# Patient Record
Sex: FEMALE | Race: WHITE | NOT HISPANIC OR LATINO
[De-identification: names, ages, dates, MRNs, and addresses within clinical notes are randomized per-mention and may not be internally consistent; named-entity substitution may affect disease eponyms.]

---

## 2017-01-04 ENCOUNTER — OTHER (OUTPATIENT)
Age: 57
End: 2017-01-04

## 2017-02-21 ENCOUNTER — APPOINTMENT (OUTPATIENT)
Dept: GYNECOLOGIC ONCOLOGY | Facility: CLINIC | Age: 57
End: 2017-02-21

## 2017-02-21 VITALS
SYSTOLIC BLOOD PRESSURE: 140 MMHG | HEIGHT: 63 IN | WEIGHT: 240 LBS | BODY MASS INDEX: 42.52 KG/M2 | DIASTOLIC BLOOD PRESSURE: 78 MMHG

## 2017-02-24 ENCOUNTER — OTHER (OUTPATIENT)
Age: 57
End: 2017-02-24

## 2017-11-13 ENCOUNTER — APPOINTMENT (OUTPATIENT)
Dept: GYNECOLOGIC ONCOLOGY | Facility: CLINIC | Age: 57
End: 2017-11-13
Payer: COMMERCIAL

## 2017-11-13 VITALS
SYSTOLIC BLOOD PRESSURE: 140 MMHG | DIASTOLIC BLOOD PRESSURE: 78 MMHG | BODY MASS INDEX: 42.7 KG/M2 | WEIGHT: 241 LBS | HEIGHT: 63 IN

## 2017-11-13 PROCEDURE — 99213 OFFICE O/P EST LOW 20 MIN: CPT

## 2018-07-02 ENCOUNTER — APPOINTMENT (OUTPATIENT)
Dept: GYNECOLOGIC ONCOLOGY | Facility: CLINIC | Age: 58
End: 2018-07-02
Payer: COMMERCIAL

## 2018-07-02 VITALS
SYSTOLIC BLOOD PRESSURE: 132 MMHG | DIASTOLIC BLOOD PRESSURE: 80 MMHG | HEIGHT: 63 IN | BODY MASS INDEX: 43.94 KG/M2 | WEIGHT: 248 LBS

## 2018-07-02 DIAGNOSIS — R92.8 OTHER ABNORMAL AND INCONCLUSIVE FINDINGS ON DIAGNOSTIC IMAGING OF BREAST: ICD-10-CM

## 2018-07-02 PROCEDURE — 99214 OFFICE O/P EST MOD 30 MIN: CPT

## 2018-07-20 ENCOUNTER — RX RENEWAL (OUTPATIENT)
Age: 58
End: 2018-07-20

## 2018-09-19 ENCOUNTER — APPOINTMENT (OUTPATIENT)
Dept: MAMMOGRAPHY | Facility: CLINIC | Age: 58
End: 2018-09-19

## 2018-09-19 ENCOUNTER — APPOINTMENT (OUTPATIENT)
Dept: ULTRASOUND IMAGING | Facility: CLINIC | Age: 58
End: 2018-09-19

## 2018-10-25 ENCOUNTER — RX RENEWAL (OUTPATIENT)
Age: 58
End: 2018-10-25

## 2018-12-24 ENCOUNTER — APPOINTMENT (OUTPATIENT)
Dept: INTERNAL MEDICINE | Facility: CLINIC | Age: 58
End: 2018-12-24
Payer: COMMERCIAL

## 2018-12-24 VITALS
BODY MASS INDEX: 44.65 KG/M2 | WEIGHT: 252 LBS | SYSTOLIC BLOOD PRESSURE: 120 MMHG | HEIGHT: 63 IN | TEMPERATURE: 98.2 F | DIASTOLIC BLOOD PRESSURE: 72 MMHG

## 2018-12-24 DIAGNOSIS — M62.838 OTHER MUSCLE SPASM: ICD-10-CM

## 2018-12-24 PROCEDURE — 36415 COLL VENOUS BLD VENIPUNCTURE: CPT

## 2018-12-24 PROCEDURE — 99214 OFFICE O/P EST MOD 30 MIN: CPT | Mod: 25

## 2018-12-24 NOTE — HISTORY OF PRESENT ILLNESS
[FreeTextEntry1] : Pt 57 y/o female present for a f/u visit, fasting, pt wants to talk with provider about a back pain. [de-identified] : Pt presents to the office today for follow up with fasting blood work\par She is feeling well overall with only complaint of right LBP for the last 2 weeks.  Only possible trigger that she can think of is holding her 25lb grand child.\par Diet and exercise both have been poor. \par Pt has been compliant with medication

## 2018-12-24 NOTE — PLAN
[FreeTextEntry1] : FBW done in office today\par Improvement with diet is needed and needs to start cardiovascular exercise.\par Follow up again with me in 6 months or before then if needed

## 2018-12-24 NOTE — PHYSICAL EXAM
[No Acute Distress] : no acute distress [Well Nourished] : well nourished [Normal Sclera/Conjunctiva] : normal sclera/conjunctiva [PERRL] : pupils equal round and reactive to light [EOMI] : extraocular movements intact [Normal Outer Ear/Nose] : the outer ears and nose were normal in appearance [Normal Oropharynx] : the oropharynx was normal [Normal TMs] : both tympanic membranes were normal [No JVD] : no jugular venous distention [Supple] : supple [No Lymphadenopathy] : no lymphadenopathy [No Respiratory Distress] : no respiratory distress  [Clear to Auscultation] : lungs were clear to auscultation bilaterally [No Accessory Muscle Use] : no accessory muscle use [Normal Rate] : normal rate  [Regular Rhythm] : with a regular rhythm [Normal S1, S2] : normal S1 and S2 [de-identified] : Right lower back paraspinal muscles tenderness to palpation.  FROM

## 2018-12-27 ENCOUNTER — RX RENEWAL (OUTPATIENT)
Age: 58
End: 2018-12-27

## 2018-12-31 LAB
ALBUMIN SERPL ELPH-MCNC: 4.5 G/DL
ALP BLD-CCNC: 108 U/L
ALT SERPL-CCNC: 24 U/L
ANION GAP SERPL CALC-SCNC: 11 MMOL/L
AST SERPL-CCNC: 21 U/L
BASOPHILS # BLD AUTO: 0.03 K/UL
BASOPHILS NFR BLD AUTO: 0.5 %
BILIRUB SERPL-MCNC: 0.4 MG/DL
BUN SERPL-MCNC: 25 MG/DL
CALCIUM SERPL-MCNC: 11.1 MG/DL
CHLORIDE SERPL-SCNC: 102 MMOL/L
CHOLEST SERPL-MCNC: 195 MG/DL
CHOLEST/HDLC SERPL: 2.7 RATIO
CO2 SERPL-SCNC: 27 MMOL/L
CREAT SERPL-MCNC: 0.96 MG/DL
EOSINOPHIL # BLD AUTO: 0.41 K/UL
EOSINOPHIL NFR BLD AUTO: 6.2 %
GLUCOSE SERPL-MCNC: 97 MG/DL
HBA1C MFR BLD HPLC: 5.7 %
HCT VFR BLD CALC: 43.9 %
HDLC SERPL-MCNC: 72 MG/DL
HGB BLD-MCNC: 14.4 G/DL
IMM GRANULOCYTES NFR BLD AUTO: 0.2 %
LDLC SERPL CALC-MCNC: 109 MG/DL
LYMPHOCYTES # BLD AUTO: 1.48 K/UL
LYMPHOCYTES NFR BLD AUTO: 22.3 %
MAN DIFF?: NORMAL
MCHC RBC-ENTMCNC: 28.9 PG
MCHC RBC-ENTMCNC: 32.8 GM/DL
MCV RBC AUTO: 88.2 FL
MONOCYTES # BLD AUTO: 0.63 K/UL
MONOCYTES NFR BLD AUTO: 9.5 %
NEUTROPHILS # BLD AUTO: 4.08 K/UL
NEUTROPHILS NFR BLD AUTO: 61.3 %
PLATELET # BLD AUTO: 232 K/UL
POTASSIUM SERPL-SCNC: 4.6 MMOL/L
PROT SERPL-MCNC: 7.5 G/DL
RBC # BLD: 4.98 M/UL
RBC # FLD: 13.9 %
SODIUM SERPL-SCNC: 140 MMOL/L
TRIGL SERPL-MCNC: 70 MG/DL
WBC # FLD AUTO: 6.64 K/UL

## 2019-01-02 ENCOUNTER — RESULT CHARGE (OUTPATIENT)
Age: 59
End: 2019-01-02

## 2019-02-19 ENCOUNTER — APPOINTMENT (OUTPATIENT)
Dept: ULTRASOUND IMAGING | Facility: CLINIC | Age: 59
End: 2019-02-19

## 2019-02-19 ENCOUNTER — APPOINTMENT (OUTPATIENT)
Dept: MAMMOGRAPHY | Facility: CLINIC | Age: 59
End: 2019-02-19

## 2019-02-23 ENCOUNTER — CHART COPY (OUTPATIENT)
Age: 59
End: 2019-02-23

## 2019-03-18 ENCOUNTER — APPOINTMENT (OUTPATIENT)
Dept: GYNECOLOGIC ONCOLOGY | Facility: CLINIC | Age: 59
End: 2019-03-18
Payer: COMMERCIAL

## 2019-03-18 VITALS
DIASTOLIC BLOOD PRESSURE: 87 MMHG | SYSTOLIC BLOOD PRESSURE: 136 MMHG | HEIGHT: 63 IN | BODY MASS INDEX: 45.71 KG/M2 | WEIGHT: 258 LBS

## 2019-03-18 PROCEDURE — 99214 OFFICE O/P EST MOD 30 MIN: CPT

## 2019-03-19 ENCOUNTER — TRANSCRIPTION ENCOUNTER (OUTPATIENT)
Age: 59
End: 2019-03-19

## 2019-08-06 ENCOUNTER — RX RENEWAL (OUTPATIENT)
Age: 59
End: 2019-08-06

## 2019-08-08 ENCOUNTER — APPOINTMENT (OUTPATIENT)
Dept: INTERNAL MEDICINE | Facility: CLINIC | Age: 59
End: 2019-08-08
Payer: COMMERCIAL

## 2019-08-08 VITALS
OXYGEN SATURATION: 93 % | SYSTOLIC BLOOD PRESSURE: 122 MMHG | DIASTOLIC BLOOD PRESSURE: 90 MMHG | WEIGHT: 260 LBS | HEIGHT: 63 IN | HEART RATE: 100 BPM | TEMPERATURE: 97.2 F | BODY MASS INDEX: 46.07 KG/M2

## 2019-08-08 PROCEDURE — 99214 OFFICE O/P EST MOD 30 MIN: CPT | Mod: 25

## 2019-08-08 PROCEDURE — 36415 COLL VENOUS BLD VENIPUNCTURE: CPT

## 2019-08-08 NOTE — HISTORY OF PRESENT ILLNESS
[FreeTextEntry1] : 59 y/o female present for a f/i visit and BP check.  [de-identified] : Pt presents to the office today for follow up with FBW\par She has been feeling well overall\par Diet is fair but lacking exercise\par She suffered with left shoulder fx in June when she fell in a parking lot \par She has been compliant with her medication

## 2019-08-08 NOTE — PLAN
[FreeTextEntry1] : FBW done today in office\par She will further improve diet and exercise as tolerated.\par With difficulty losing weight I recommended she see weight management for further help with this.  Names and numbers given to pt.\par She will continue with current medication and follow up again with me in 6 months or before then if needed\par If calcium and PTH come back elevated again pt will see ENDO for consult

## 2019-08-09 ENCOUNTER — TRANSCRIPTION ENCOUNTER (OUTPATIENT)
Age: 59
End: 2019-08-09

## 2019-08-19 ENCOUNTER — TRANSCRIPTION ENCOUNTER (OUTPATIENT)
Age: 59
End: 2019-08-19

## 2019-08-19 LAB
ALBUMIN SERPL ELPH-MCNC: 4.7 G/DL
ALP BLD-CCNC: 134 U/L
ALT SERPL-CCNC: 30 U/L
ANION GAP SERPL CALC-SCNC: 12 MMOL/L
AST SERPL-CCNC: 19 U/L
BASOPHILS # BLD AUTO: 0.06 K/UL
BASOPHILS NFR BLD AUTO: 0.8 %
BILIRUB SERPL-MCNC: 0.7 MG/DL
BUN SERPL-MCNC: 31 MG/DL
CALCIUM SERPL-MCNC: 11.6 MG/DL
CHLORIDE SERPL-SCNC: 102 MMOL/L
CHOLEST SERPL-MCNC: 202 MG/DL
CHOLEST/HDLC SERPL: 3.2 RATIO
CO2 SERPL-SCNC: 28 MMOL/L
CREAT SERPL-MCNC: 0.85 MG/DL
EOSINOPHIL # BLD AUTO: 0.36 K/UL
EOSINOPHIL NFR BLD AUTO: 4.9 %
ESTIMATED AVERAGE GLUCOSE: 114 MG/DL
GLUCOSE SERPL-MCNC: 111 MG/DL
HBA1C MFR BLD HPLC: 5.6 %
HCT VFR BLD CALC: 46.2 %
HDLC SERPL-MCNC: 63 MG/DL
HGB BLD-MCNC: 14.8 G/DL
IMM GRANULOCYTES NFR BLD AUTO: 0.3 %
LDLC SERPL CALC-MCNC: 123 MG/DL
LYMPHOCYTES # BLD AUTO: 1.66 K/UL
LYMPHOCYTES NFR BLD AUTO: 22.6 %
MAN DIFF?: NORMAL
MCHC RBC-ENTMCNC: 29.2 PG
MCHC RBC-ENTMCNC: 32 GM/DL
MCV RBC AUTO: 91.1 FL
MONOCYTES # BLD AUTO: 0.82 K/UL
MONOCYTES NFR BLD AUTO: 11.1 %
NEUTROPHILS # BLD AUTO: 4.44 K/UL
NEUTROPHILS NFR BLD AUTO: 60.3 %
PLATELET # BLD AUTO: 260 K/UL
POTASSIUM SERPL-SCNC: 4.5 MMOL/L
PROT SERPL-MCNC: 7.6 G/DL
PTH RELATED PROT SERPL-MCNC: <2 PMOL/L
RBC # BLD: 5.07 M/UL
RBC # FLD: 13.4 %
SODIUM SERPL-SCNC: 142 MMOL/L
TRIGL SERPL-MCNC: 82 MG/DL
WBC # FLD AUTO: 7.36 K/UL

## 2019-11-20 ENCOUNTER — OTHER (OUTPATIENT)
Age: 59
End: 2019-11-20

## 2019-12-23 ENCOUNTER — APPOINTMENT (OUTPATIENT)
Dept: GYNECOLOGIC ONCOLOGY | Facility: CLINIC | Age: 59
End: 2019-12-23
Payer: COMMERCIAL

## 2019-12-23 VITALS — HEIGHT: 63 IN | BODY MASS INDEX: 46.07 KG/M2 | WEIGHT: 260 LBS

## 2019-12-23 PROCEDURE — 99213 OFFICE O/P EST LOW 20 MIN: CPT

## 2020-01-02 ENCOUNTER — APPOINTMENT (OUTPATIENT)
Dept: INTERNAL MEDICINE | Facility: CLINIC | Age: 60
End: 2020-01-02
Payer: COMMERCIAL

## 2020-01-02 VITALS
WEIGHT: 260 LBS | SYSTOLIC BLOOD PRESSURE: 148 MMHG | BODY MASS INDEX: 46.07 KG/M2 | DIASTOLIC BLOOD PRESSURE: 80 MMHG | TEMPERATURE: 97.3 F | HEIGHT: 63 IN

## 2020-01-02 PROCEDURE — 99214 OFFICE O/P EST MOD 30 MIN: CPT

## 2020-01-02 RX ORDER — METHYLPREDNISOLONE 4 MG/1
4 TABLET ORAL
Qty: 1 | Refills: 0 | Status: COMPLETED | COMMUNITY
Start: 2018-12-24 | End: 2020-01-02

## 2020-01-02 RX ORDER — TIZANIDINE 4 MG/1
4 TABLET ORAL
Qty: 30 | Refills: 0 | Status: COMPLETED | COMMUNITY
Start: 2018-12-24 | End: 2020-01-02

## 2020-01-02 NOTE — HISTORY OF PRESENT ILLNESS
[de-identified] : Pt presents to the office today for BP check. She has been feeling well only concern was that her pressure was elevated at Oncologist office 191/101.\par Pt states electronic cuff used on her wrist.

## 2020-01-02 NOTE — PLAN
[FreeTextEntry1] : Manual BP done today on pt and her level is in good range.  No medication dose change needed.\par She was upset the day she saw Oncologist as her daughter had a miscarriage.\par i feel that the BP machine did not accurately  read her as she was anxious at that time.\par Today her BP is in good range and in her normal range.

## 2020-01-03 ENCOUNTER — TRANSCRIPTION ENCOUNTER (OUTPATIENT)
Age: 60
End: 2020-01-03

## 2020-06-29 ENCOUNTER — RX RENEWAL (OUTPATIENT)
Age: 60
End: 2020-06-29

## 2020-06-29 ENCOUNTER — APPOINTMENT (OUTPATIENT)
Dept: GYNECOLOGIC ONCOLOGY | Facility: CLINIC | Age: 60
End: 2020-06-29
Payer: COMMERCIAL

## 2020-06-29 VITALS
SYSTOLIC BLOOD PRESSURE: 145 MMHG | HEART RATE: 95 BPM | WEIGHT: 274 LBS | BODY MASS INDEX: 48.54 KG/M2 | DIASTOLIC BLOOD PRESSURE: 89 MMHG

## 2020-06-29 PROCEDURE — 99213 OFFICE O/P EST LOW 20 MIN: CPT

## 2020-09-03 ENCOUNTER — APPOINTMENT (OUTPATIENT)
Dept: INTERNAL MEDICINE | Facility: CLINIC | Age: 60
End: 2020-09-03
Payer: COMMERCIAL

## 2020-09-03 VITALS
WEIGHT: 264 LBS | HEIGHT: 65 IN | HEART RATE: 96 BPM | SYSTOLIC BLOOD PRESSURE: 110 MMHG | DIASTOLIC BLOOD PRESSURE: 70 MMHG | OXYGEN SATURATION: 95 % | BODY MASS INDEX: 43.99 KG/M2

## 2020-09-03 DIAGNOSIS — M54.5 LOW BACK PAIN: ICD-10-CM

## 2020-09-03 PROCEDURE — 99214 OFFICE O/P EST MOD 30 MIN: CPT

## 2020-09-03 NOTE — HISTORY OF PRESENT ILLNESS
[FreeTextEntry8] : 61 y/o female present today for lower back pain, pt. states that she has been taking Advil for 7 days and the pain is not subsiding. \par Pain is in the right lower back that radiates down her right buttocks and leg\par Pain worse from sitting to standing\par Pain is a 7 out of 10

## 2020-09-03 NOTE — PLAN
[FreeTextEntry1] : Pt will do PT with her niece \par She will start medications above \par Increase fluids \par Go  for Xrays of Lumbar sacral spine\par Follow up in 1 week if not better

## 2020-09-03 NOTE — PHYSICAL EXAM
[de-identified] : Acute pain [de-identified] : Decreased range of motion with bending and twisting difficulty from sitting to standing

## 2020-10-02 DIAGNOSIS — M51.36 OTHER INTERVERTEBRAL DISC DEGENERATION, LUMBAR REGION: ICD-10-CM

## 2021-01-04 ENCOUNTER — NON-APPOINTMENT (OUTPATIENT)
Age: 61
End: 2021-01-04

## 2021-03-01 ENCOUNTER — NON-APPOINTMENT (OUTPATIENT)
Age: 61
End: 2021-03-01

## 2021-03-04 ENCOUNTER — RX RENEWAL (OUTPATIENT)
Age: 61
End: 2021-03-04

## 2021-03-30 ENCOUNTER — APPOINTMENT (OUTPATIENT)
Dept: INTERNAL MEDICINE | Facility: CLINIC | Age: 61
End: 2021-03-30
Payer: COMMERCIAL

## 2021-03-30 VITALS
HEIGHT: 65 IN | TEMPERATURE: 99.3 F | HEART RATE: 87 BPM | SYSTOLIC BLOOD PRESSURE: 100 MMHG | WEIGHT: 250.1 LBS | OXYGEN SATURATION: 98 % | DIASTOLIC BLOOD PRESSURE: 78 MMHG | BODY MASS INDEX: 41.67 KG/M2

## 2021-03-30 DIAGNOSIS — K57.90 DIVERTICULOSIS OF INTESTINE, PART UNSPECIFIED, W/OUT PERFORATION OR ABSCESS W/OUT BLEEDING: ICD-10-CM

## 2021-03-30 PROCEDURE — 99214 OFFICE O/P EST MOD 30 MIN: CPT | Mod: 25

## 2021-03-30 PROCEDURE — 99072 ADDL SUPL MATRL&STAF TM PHE: CPT

## 2021-03-30 PROCEDURE — 36415 COLL VENOUS BLD VENIPUNCTURE: CPT

## 2021-03-30 RX ORDER — CARISOPRODOL 350 MG/1
350 TABLET ORAL
Qty: 30 | Refills: 0 | Status: COMPLETED | COMMUNITY
Start: 2020-09-03 | End: 2021-03-30

## 2021-03-30 RX ORDER — METHYLPREDNISOLONE 4 MG/1
4 TABLET ORAL
Qty: 1 | Refills: 0 | Status: COMPLETED | COMMUNITY
Start: 2020-09-03 | End: 2021-03-30

## 2021-03-30 NOTE — PHYSICAL EXAM
[Normal] : normal rate, regular rhythm, normal S1 and S2 and no murmur heard [Soft] : abdomen soft [Non-distended] : non-distended [No Masses] : no abdominal mass palpated [Normal Bowel Sounds] : normal bowel sounds [de-identified] : Mild tenderness left lower quadrant with deep palpation no guarding or rebound

## 2021-03-30 NOTE — HISTORY OF PRESENT ILLNESS
[FreeTextEntry1] : 59 y/o female present today for a f/u visit and med renewals.  [de-identified] : Presents the office today for follow-up and fasting blood work\par Has been feeling well overall\par Diet has been doing well she is currently on Nutrisystem\par Patient has lost weight since last visit 15 pounds\par For the last couple days patient's been having left lower quadrant tenderness and has a history of diverticulitis\par Has no fevers no chills no bowel changes no blood in stool

## 2021-03-30 NOTE — PLAN
[FreeTextEntry1] : Appears to be having a beginning of a mild diverticulitis as she has had in the past.  Patient will start Augmentin as given above.  If no improvement patient will then need a CT and changing of medication to Cipro Flagyl  \par CBC to check white blood cell count.  Patient has any worsening lower left quadrant pain fevers chills or blood in stool patient will call\par Blood work done in office today\par Patient will continue with healthy improve diet\par Patient blood pressure is doing well will DC lisinopril to hydrochlorothiazide and just keep patient on lisinopril 10 mg\par Patient will follow up with me in 6 months\par

## 2021-04-01 ENCOUNTER — RX RENEWAL (OUTPATIENT)
Age: 61
End: 2021-04-01

## 2021-04-12 ENCOUNTER — TRANSCRIPTION ENCOUNTER (OUTPATIENT)
Age: 61
End: 2021-04-12

## 2021-04-12 DIAGNOSIS — Z86.39 PERSONAL HISTORY OF OTHER ENDOCRINE, NUTRITIONAL AND METABOLIC DISEASE: ICD-10-CM

## 2021-04-12 LAB
ALBUMIN SERPL ELPH-MCNC: 4.6 G/DL
ALP BLD-CCNC: 118 U/L
ALT SERPL-CCNC: 22 U/L
ANION GAP SERPL CALC-SCNC: 9 MMOL/L
AST SERPL-CCNC: 21 U/L
BASOPHILS # BLD AUTO: 0.05 K/UL
BASOPHILS NFR BLD AUTO: 0.7 %
BILIRUB SERPL-MCNC: 0.4 MG/DL
BUN SERPL-MCNC: 21 MG/DL
CALCIUM SERPL-MCNC: 11.2 MG/DL
CHLORIDE SERPL-SCNC: 103 MMOL/L
CHOLEST SERPL-MCNC: 191 MG/DL
CO2 SERPL-SCNC: 28 MMOL/L
CREAT SERPL-MCNC: 0.83 MG/DL
EOSINOPHIL # BLD AUTO: 0.33 K/UL
EOSINOPHIL NFR BLD AUTO: 4.9 %
ESTIMATED AVERAGE GLUCOSE: 111 MG/DL
GLUCOSE SERPL-MCNC: 107 MG/DL
HBA1C MFR BLD HPLC: 5.5 %
HCT VFR BLD CALC: 44.5 %
HDLC SERPL-MCNC: 62 MG/DL
HGB BLD-MCNC: 14.3 G/DL
IMM GRANULOCYTES NFR BLD AUTO: 0.3 %
LDLC SERPL CALC-MCNC: 106 MG/DL
LYMPHOCYTES # BLD AUTO: 1.67 K/UL
LYMPHOCYTES NFR BLD AUTO: 24.7 %
MAN DIFF?: NORMAL
MCHC RBC-ENTMCNC: 29.7 PG
MCHC RBC-ENTMCNC: 32.1 GM/DL
MCV RBC AUTO: 92.3 FL
MONOCYTES # BLD AUTO: 0.55 K/UL
MONOCYTES NFR BLD AUTO: 8.1 %
NEUTROPHILS # BLD AUTO: 4.15 K/UL
NEUTROPHILS NFR BLD AUTO: 61.3 %
NONHDLC SERPL-MCNC: 129 MG/DL
PLATELET # BLD AUTO: 258 K/UL
POTASSIUM SERPL-SCNC: 4.3 MMOL/L
PROT SERPL-MCNC: 7.2 G/DL
RBC # BLD: 4.82 M/UL
RBC # FLD: 13.4 %
SODIUM SERPL-SCNC: 140 MMOL/L
TRIGL SERPL-MCNC: 113 MG/DL
WBC # FLD AUTO: 6.77 K/UL

## 2021-06-28 ENCOUNTER — APPOINTMENT (OUTPATIENT)
Dept: GYNECOLOGIC ONCOLOGY | Facility: CLINIC | Age: 61
End: 2021-06-28
Payer: COMMERCIAL

## 2021-06-28 VITALS
SYSTOLIC BLOOD PRESSURE: 131 MMHG | BODY MASS INDEX: 40.82 KG/M2 | WEIGHT: 245 LBS | DIASTOLIC BLOOD PRESSURE: 85 MMHG | HEIGHT: 65 IN | HEART RATE: 81 BPM | OXYGEN SATURATION: 98 %

## 2021-06-28 PROCEDURE — 99213 OFFICE O/P EST LOW 20 MIN: CPT

## 2021-06-28 PROCEDURE — 99072 ADDL SUPL MATRL&STAF TM PHE: CPT

## 2021-06-28 RX ORDER — AMOXICILLIN AND CLAVULANATE POTASSIUM 875; 125 MG/1; MG/1
875-125 TABLET, COATED ORAL
Qty: 20 | Refills: 0 | Status: DISCONTINUED | COMMUNITY
Start: 2021-03-30 | End: 2021-06-28

## 2021-09-27 ENCOUNTER — RX RENEWAL (OUTPATIENT)
Age: 61
End: 2021-09-27

## 2021-09-30 ENCOUNTER — APPOINTMENT (OUTPATIENT)
Dept: INTERNAL MEDICINE | Facility: CLINIC | Age: 61
End: 2021-09-30
Payer: COMMERCIAL

## 2021-09-30 VITALS
SYSTOLIC BLOOD PRESSURE: 140 MMHG | WEIGHT: 250 LBS | HEIGHT: 65 IN | OXYGEN SATURATION: 97 % | DIASTOLIC BLOOD PRESSURE: 80 MMHG | HEART RATE: 98 BPM | BODY MASS INDEX: 41.65 KG/M2 | TEMPERATURE: 98.5 F

## 2021-09-30 VITALS — SYSTOLIC BLOOD PRESSURE: 122 MMHG | DIASTOLIC BLOOD PRESSURE: 70 MMHG

## 2021-09-30 DIAGNOSIS — G47.00 INSOMNIA, UNSPECIFIED: ICD-10-CM

## 2021-09-30 DIAGNOSIS — Z20.822 CONTACT WITH AND (SUSPECTED) EXPOSURE TO COVID-19: ICD-10-CM

## 2021-09-30 PROCEDURE — 36415 COLL VENOUS BLD VENIPUNCTURE: CPT

## 2021-09-30 PROCEDURE — 99214 OFFICE O/P EST MOD 30 MIN: CPT | Mod: 25

## 2021-09-30 RX ORDER — ESZOPICLONE 2 MG/1
2 TABLET, FILM COATED ORAL
Qty: 30 | Refills: 3 | Status: ACTIVE | COMMUNITY
Start: 2021-09-30 | End: 1900-01-01

## 2021-09-30 NOTE — HISTORY OF PRESENT ILLNESS
[FreeTextEntry1] : 61 year old female presents here for 6 month follow up  [de-identified] : Presents the office today for follow-up and medication refill\par She is currently being treated for tennis elbow with diclofenac and had cortisone injection done her left elbow\par She is having complaints of having intermittent insomnia\par Patient would like to have Covid antibodies checked today prior to getting third vaccination dose\par

## 2021-09-30 NOTE — PLAN
[FreeTextEntry1] : Patient will start medium dose of Lunesta to help with her insomnia\par She will continue taking lisinopril for blood pressure\par Continue improving diet and exercise to further help with weight loss\par She has not had her parathyroid hormone checked over 1 year and will check parathyroid hormone today since she has a history of hyperparathyroidism\par Patient will follow-up again with me in 6 months or before then if needed

## 2021-10-08 ENCOUNTER — TRANSCRIPTION ENCOUNTER (OUTPATIENT)
Age: 61
End: 2021-10-08

## 2021-10-08 LAB
CALCIUM SERPL-MCNC: 11.4 MG/DL
COVID-19 SPIKE DOMAIN ANTIBODY INTERPRETATION: POSITIVE
PARATHYROID HORMONE INTACT: 76 PG/ML
SARS-COV-2 AB SERPL IA-ACNC: >250 U/ML
TSH SERPL-ACNC: 1.18 UIU/ML

## 2022-01-24 ENCOUNTER — APPOINTMENT (OUTPATIENT)
Dept: GYNECOLOGIC ONCOLOGY | Facility: CLINIC | Age: 62
End: 2022-01-24
Payer: COMMERCIAL

## 2022-01-24 VITALS
HEIGHT: 65 IN | BODY MASS INDEX: 41.82 KG/M2 | HEART RATE: 88 BPM | WEIGHT: 251 LBS | SYSTOLIC BLOOD PRESSURE: 133 MMHG | DIASTOLIC BLOOD PRESSURE: 90 MMHG

## 2022-01-24 DIAGNOSIS — Z13.820 ENCOUNTER FOR SCREENING FOR OSTEOPOROSIS: ICD-10-CM

## 2022-01-24 PROCEDURE — 99213 OFFICE O/P EST LOW 20 MIN: CPT

## 2022-01-24 RX ORDER — DICLOFENAC SODIUM 75 MG/1
75 TABLET, DELAYED RELEASE ORAL
Refills: 0 | Status: DISCONTINUED | COMMUNITY
End: 2022-01-24

## 2022-03-15 ENCOUNTER — TRANSCRIPTION ENCOUNTER (OUTPATIENT)
Age: 62
End: 2022-03-15

## 2022-06-20 ENCOUNTER — NON-APPOINTMENT (OUTPATIENT)
Age: 62
End: 2022-06-20

## 2022-06-20 ENCOUNTER — RX RENEWAL (OUTPATIENT)
Age: 62
End: 2022-06-20

## 2022-06-29 ENCOUNTER — APPOINTMENT (OUTPATIENT)
Dept: GYNECOLOGIC ONCOLOGY | Facility: CLINIC | Age: 62
End: 2022-06-29

## 2022-06-29 VITALS
DIASTOLIC BLOOD PRESSURE: 99 MMHG | SYSTOLIC BLOOD PRESSURE: 177 MMHG | BODY MASS INDEX: 41.95 KG/M2 | HEIGHT: 66 IN | HEART RATE: 91 BPM | WEIGHT: 261 LBS

## 2022-06-29 PROCEDURE — 99213 OFFICE O/P EST LOW 20 MIN: CPT

## 2022-07-05 ENCOUNTER — RX RENEWAL (OUTPATIENT)
Age: 62
End: 2022-07-05

## 2022-07-20 ENCOUNTER — APPOINTMENT (OUTPATIENT)
Dept: INTERNAL MEDICINE | Facility: CLINIC | Age: 62
End: 2022-07-20

## 2022-07-20 VITALS
HEIGHT: 65 IN | OXYGEN SATURATION: 97 % | BODY MASS INDEX: 42.65 KG/M2 | DIASTOLIC BLOOD PRESSURE: 70 MMHG | TEMPERATURE: 98.3 F | SYSTOLIC BLOOD PRESSURE: 118 MMHG | WEIGHT: 256 LBS | HEART RATE: 81 BPM

## 2022-07-20 DIAGNOSIS — E83.52 HYPERCALCEMIA: ICD-10-CM

## 2022-07-20 DIAGNOSIS — M25.562 PAIN IN RIGHT KNEE: ICD-10-CM

## 2022-07-20 DIAGNOSIS — M25.561 PAIN IN RIGHT KNEE: ICD-10-CM

## 2022-07-20 PROCEDURE — 99214 OFFICE O/P EST MOD 30 MIN: CPT | Mod: 25

## 2022-07-20 PROCEDURE — 36415 COLL VENOUS BLD VENIPUNCTURE: CPT

## 2022-07-20 RX ORDER — BENZONATATE 200 MG/1
200 CAPSULE ORAL
Qty: 20 | Refills: 0 | Status: COMPLETED | COMMUNITY
Start: 2022-05-28

## 2022-07-20 RX ORDER — FLUTICASONE PROPIONATE 50 UG/1
50 SPRAY, METERED NASAL
Qty: 16 | Refills: 0 | Status: COMPLETED | COMMUNITY
Start: 2022-05-28

## 2022-07-20 RX ORDER — AMOXICILLIN 500 MG/1
500 CAPSULE ORAL
Qty: 16 | Refills: 0 | Status: COMPLETED | COMMUNITY
Start: 2022-06-23

## 2022-07-20 NOTE — PLAN
[FreeTextEntry1] : Patient will continue to improve diet and exercise\par Patient will make appointment with endocrinologist for follow-up of hyperparathyroidism as well as weight management\par Patient will continue with current medications\par Prescription for diclofenac given to patient for intermittent use for bilateral knee pain\par Patient will follow-up again with me in 6 months or before then if needed\par Fasting blood work done in office today

## 2022-07-20 NOTE — HISTORY OF PRESENT ILLNESS
[FreeTextEntry1] : 60 y/o female presents to the office today for a f/u visit and BP check.  [de-identified] : See office today for follow-up with fasting blood work and blood pressure check\par Patient has made her appointment with endocrinology\par Patient has been having intermittent knee pains and using an old prescription she had for diclofenac that gives her good improvement

## 2022-08-01 ENCOUNTER — TRANSCRIPTION ENCOUNTER (OUTPATIENT)
Age: 62
End: 2022-08-01

## 2022-08-01 LAB
ALBUMIN SERPL ELPH-MCNC: 4.6 G/DL
ALP BLD-CCNC: 112 U/L
ALT SERPL-CCNC: 25 U/L
ANION GAP SERPL CALC-SCNC: 9 MMOL/L
AST SERPL-CCNC: 19 U/L
BASOPHILS # BLD AUTO: 0.05 K/UL
BASOPHILS NFR BLD AUTO: 0.9 %
BILIRUB SERPL-MCNC: 0.4 MG/DL
BUN SERPL-MCNC: 25 MG/DL
CALCIUM SERPL-MCNC: 11 MG/DL
CALCIUM SERPL-MCNC: 11 MG/DL
CHLORIDE SERPL-SCNC: 106 MMOL/L
CHOLEST SERPL-MCNC: 172 MG/DL
CO2 SERPL-SCNC: 27 MMOL/L
CREAT SERPL-MCNC: 0.88 MG/DL
EGFR: 74 ML/MIN/1.73M2
EOSINOPHIL # BLD AUTO: 0.37 K/UL
EOSINOPHIL NFR BLD AUTO: 7 %
ESTIMATED AVERAGE GLUCOSE: 117 MG/DL
GLUCOSE SERPL-MCNC: 96 MG/DL
HBA1C MFR BLD HPLC: 5.7 %
HCT VFR BLD CALC: 43.6 %
HDLC SERPL-MCNC: 63 MG/DL
HGB BLD-MCNC: 14 G/DL
IMM GRANULOCYTES NFR BLD AUTO: 0.2 %
LDLC SERPL CALC-MCNC: 93 MG/DL
LYMPHOCYTES # BLD AUTO: 1.86 K/UL
LYMPHOCYTES NFR BLD AUTO: 35.1 %
MAN DIFF?: NORMAL
MCHC RBC-ENTMCNC: 29.4 PG
MCHC RBC-ENTMCNC: 32.1 GM/DL
MCV RBC AUTO: 91.4 FL
MONOCYTES # BLD AUTO: 0.45 K/UL
MONOCYTES NFR BLD AUTO: 8.5 %
NEUTROPHILS # BLD AUTO: 2.56 K/UL
NEUTROPHILS NFR BLD AUTO: 48.3 %
NONHDLC SERPL-MCNC: 109 MG/DL
PARATHYROID HORMONE INTACT: 62 PG/ML
PLATELET # BLD AUTO: 234 K/UL
POTASSIUM SERPL-SCNC: 4.6 MMOL/L
PROT SERPL-MCNC: 7.1 G/DL
RBC # BLD: 4.77 M/UL
RBC # FLD: 13.8 %
SODIUM SERPL-SCNC: 141 MMOL/L
TRIGL SERPL-MCNC: 80 MG/DL
TSH SERPL-ACNC: 1.12 UIU/ML
WBC # FLD AUTO: 5.3 K/UL

## 2022-11-21 ENCOUNTER — NON-APPOINTMENT (OUTPATIENT)
Age: 62
End: 2022-11-21

## 2023-01-19 ENCOUNTER — RX RENEWAL (OUTPATIENT)
Age: 63
End: 2023-01-19

## 2023-01-20 ENCOUNTER — TRANSCRIPTION ENCOUNTER (OUTPATIENT)
Age: 63
End: 2023-01-20

## 2023-01-31 ENCOUNTER — APPOINTMENT (OUTPATIENT)
Dept: INTERNAL MEDICINE | Facility: CLINIC | Age: 63
End: 2023-01-31
Payer: COMMERCIAL

## 2023-01-31 VITALS
DIASTOLIC BLOOD PRESSURE: 86 MMHG | WEIGHT: 260 LBS | BODY MASS INDEX: 43.32 KG/M2 | TEMPERATURE: 97.5 F | OXYGEN SATURATION: 96 % | SYSTOLIC BLOOD PRESSURE: 130 MMHG | HEIGHT: 65 IN | HEART RATE: 104 BPM

## 2023-01-31 DIAGNOSIS — R10.32 LEFT LOWER QUADRANT PAIN: ICD-10-CM

## 2023-01-31 DIAGNOSIS — K57.32 DIVERTICULITIS OF LARGE INTESTINE W/OUT PERFORATION OR ABSCESS W/OUT BLEEDING: ICD-10-CM

## 2023-01-31 DIAGNOSIS — K44.9 DIAPHRAGMATIC HERNIA W/OUT OBSTRUCTION OR GANGRENE: ICD-10-CM

## 2023-01-31 DIAGNOSIS — K76.0 FATTY (CHANGE OF) LIVER, NOT ELSEWHERE CLASSIFIED: ICD-10-CM

## 2023-01-31 DIAGNOSIS — H66.90 OTITIS MEDIA, UNSPECIFIED, UNSPECIFIED EAR: ICD-10-CM

## 2023-01-31 PROCEDURE — 36415 COLL VENOUS BLD VENIPUNCTURE: CPT

## 2023-01-31 PROCEDURE — 99214 OFFICE O/P EST MOD 30 MIN: CPT | Mod: 25

## 2023-01-31 NOTE — HISTORY OF PRESENT ILLNESS
[FreeTextEntry8] : Pt presents to the office today with LLQ pain for the last 2 days \par No fever or chills \par She has a history of diverticulosis and diverticulitis\par Patient has been constipated for the last few days using MiraLAX

## 2023-01-31 NOTE — REVIEW OF SYSTEMS
[Negative] : Constitutional [FreeTextEntry3] : Left ear discomfort [FreeTextEntry7] : Left lower quadrant abdominal pain and constipation

## 2023-01-31 NOTE — PHYSICAL EXAM
[No Acute Distress] : no acute distress [Normal] : no acute distress, well nourished, well developed and well-appearing [de-identified] : Tympanic membrane intact bilaterally left TM erythema [de-identified] : Left lower quadrant pain with palpation mild guarding

## 2023-01-31 NOTE — PLAN
[FreeTextEntry1] : Patient will go for CT of abdomen and pelvis with IV contrast for further evaluation to rule out diverticulitis\par Patient will start Cipro and Flagyl as given above\par Blood work done today in office for further evaluation\par Patient does have a secondary left otitis media which will will respond with Cipro

## 2023-02-01 ENCOUNTER — TRANSCRIPTION ENCOUNTER (OUTPATIENT)
Age: 63
End: 2023-02-01

## 2023-02-01 LAB
BASOPHILS # BLD AUTO: 0.06 K/UL
BASOPHILS NFR BLD AUTO: 0.9 %
EOSINOPHIL # BLD AUTO: 0.52 K/UL
EOSINOPHIL NFR BLD AUTO: 8.1 %
HCT VFR BLD CALC: 42.5 %
HGB BLD-MCNC: 13.7 G/DL
IMM GRANULOCYTES NFR BLD AUTO: 0.2 %
LYMPHOCYTES # BLD AUTO: 1.9 K/UL
LYMPHOCYTES NFR BLD AUTO: 29.5 %
MAN DIFF?: NORMAL
MCHC RBC-ENTMCNC: 29.3 PG
MCHC RBC-ENTMCNC: 32.2 GM/DL
MCV RBC AUTO: 91 FL
MONOCYTES # BLD AUTO: 0.57 K/UL
MONOCYTES NFR BLD AUTO: 8.9 %
NEUTROPHILS # BLD AUTO: 3.38 K/UL
NEUTROPHILS NFR BLD AUTO: 52.4 %
PLATELET # BLD AUTO: 271 K/UL
RBC # BLD: 4.67 M/UL
RBC # FLD: 13.2 %
WBC # FLD AUTO: 6.44 K/UL

## 2023-02-02 PROBLEM — K44.9 HIATAL HERNIA: Status: ACTIVE | Noted: 2023-02-02

## 2023-02-02 PROBLEM — K76.0 FATTY LIVER: Status: ACTIVE | Noted: 2023-02-02

## 2023-02-06 ENCOUNTER — APPOINTMENT (OUTPATIENT)
Dept: INTERNAL MEDICINE | Facility: CLINIC | Age: 63
End: 2023-02-06

## 2023-06-27 ENCOUNTER — NON-APPOINTMENT (OUTPATIENT)
Age: 63
End: 2023-06-27

## 2023-06-28 ENCOUNTER — APPOINTMENT (OUTPATIENT)
Dept: INTERNAL MEDICINE | Facility: CLINIC | Age: 63
End: 2023-06-28
Payer: COMMERCIAL

## 2023-06-28 VITALS
WEIGHT: 255 LBS | SYSTOLIC BLOOD PRESSURE: 120 MMHG | DIASTOLIC BLOOD PRESSURE: 80 MMHG | BODY MASS INDEX: 42.49 KG/M2 | OXYGEN SATURATION: 94 % | TEMPERATURE: 98.2 F | HEART RATE: 91 BPM | HEIGHT: 65 IN

## 2023-06-28 DIAGNOSIS — M54.9 DORSALGIA, UNSPECIFIED: ICD-10-CM

## 2023-06-28 DIAGNOSIS — Z87.39 PERSONAL HISTORY OF OTHER DISEASES OF THE MUSCULOSKELETAL SYSTEM AND CONNECTIVE TISSUE: ICD-10-CM

## 2023-06-28 DIAGNOSIS — E66.9 OBESITY, UNSPECIFIED: ICD-10-CM

## 2023-06-28 PROCEDURE — 99214 OFFICE O/P EST MOD 30 MIN: CPT

## 2023-06-28 RX ORDER — CIPROFLOXACIN HYDROCHLORIDE 500 MG/1
500 TABLET, FILM COATED ORAL
Qty: 20 | Refills: 0 | Status: COMPLETED | COMMUNITY
Start: 2023-01-31 | End: 2023-06-28

## 2023-06-28 NOTE — HISTORY OF PRESENT ILLNESS
[FreeTextEntry8] : 61 y/o female presents to the office today with pain in mid back that started on Saturday\par Patient called yesterday started on cyclobenzaprine and Mobic pain was originally a 10 out of a 10 and now is 6 out of 10\par Patient is doing physical therapy\par Patient is waiting to get into weight management but no availability until October

## 2023-06-28 NOTE — PLAN
[FreeTextEntry1] : Patient will see weight management for obesity but will start topiramate until then to see if this further helps with weight loss.\par Discussed medication with patient and answered all questions.  Discussed possible side effects with medication risks and benefits.\par Patient will hold on taking meloxicam start Medrol Dosepak continue cyclobenzaprine at bedtime.  Once completing Medrol Dosepak patient can restart Mobic\par Follow-up in 1 week if symptoms have not resolved or before then if needed

## 2023-07-05 ENCOUNTER — APPOINTMENT (OUTPATIENT)
Dept: GYNECOLOGIC ONCOLOGY | Facility: CLINIC | Age: 63
End: 2023-07-05
Payer: COMMERCIAL

## 2023-07-05 VITALS — BODY MASS INDEX: 42.49 KG/M2 | HEIGHT: 65 IN | WEIGHT: 255 LBS

## 2023-07-05 DIAGNOSIS — C54.1 MALIGNANT NEOPLASM OF ENDOMETRIUM: ICD-10-CM

## 2023-07-05 DIAGNOSIS — M85.80 OTHER SPECIFIED DISORDERS OF BONE DENSITY AND STRUCTURE, UNSPECIFIED SITE: ICD-10-CM

## 2023-07-05 DIAGNOSIS — Z12.39 ENCOUNTER FOR OTHER SCREENING FOR MALIGNANT NEOPLASM OF BREAST: ICD-10-CM

## 2023-07-05 PROCEDURE — 99213 OFFICE O/P EST LOW 20 MIN: CPT

## 2023-09-05 ENCOUNTER — RX RENEWAL (OUTPATIENT)
Age: 63
End: 2023-09-05

## 2023-09-05 RX ORDER — TOPIRAMATE 25 MG/1
25 TABLET, FILM COATED ORAL
Qty: 270 | Refills: 0 | Status: ACTIVE | COMMUNITY
Start: 2023-06-28 | End: 1900-01-01

## 2023-11-22 ENCOUNTER — RX RENEWAL (OUTPATIENT)
Age: 63
End: 2023-11-22

## 2023-12-27 ENCOUNTER — NON-APPOINTMENT (OUTPATIENT)
Age: 63
End: 2023-12-27

## 2024-02-01 ENCOUNTER — RX RENEWAL (OUTPATIENT)
Age: 64
End: 2024-02-01

## 2024-02-01 RX ORDER — LISINOPRIL 10 MG/1
10 TABLET ORAL
Qty: 30 | Refills: 0 | Status: ACTIVE | COMMUNITY
Start: 2021-03-30 | End: 1900-01-01

## 2024-07-02 ENCOUNTER — APPOINTMENT (OUTPATIENT)
Dept: INTERNAL MEDICINE | Facility: CLINIC | Age: 64
End: 2024-07-02
Payer: COMMERCIAL

## 2024-07-02 VITALS
DIASTOLIC BLOOD PRESSURE: 80 MMHG | SYSTOLIC BLOOD PRESSURE: 124 MMHG | TEMPERATURE: 97.9 F | HEART RATE: 82 BPM | OXYGEN SATURATION: 95 % | HEIGHT: 65 IN | WEIGHT: 235 LBS | BODY MASS INDEX: 39.15 KG/M2 | RESPIRATION RATE: 14 BRPM

## 2024-07-02 DIAGNOSIS — E21.0 PRIMARY HYPERPARATHYROIDISM: ICD-10-CM

## 2024-07-02 DIAGNOSIS — R09.82 POSTNASAL DRIP: ICD-10-CM

## 2024-07-02 DIAGNOSIS — E78.00 PURE HYPERCHOLESTEROLEMIA, UNSPECIFIED: ICD-10-CM

## 2024-07-02 DIAGNOSIS — I10 ESSENTIAL (PRIMARY) HYPERTENSION: ICD-10-CM

## 2024-07-02 DIAGNOSIS — J00 ACUTE NASOPHARYNGITIS [COMMON COLD]: ICD-10-CM

## 2024-07-02 PROCEDURE — 36415 COLL VENOUS BLD VENIPUNCTURE: CPT

## 2024-07-02 PROCEDURE — G2211 COMPLEX E/M VISIT ADD ON: CPT

## 2024-07-02 PROCEDURE — 99214 OFFICE O/P EST MOD 30 MIN: CPT

## 2024-07-02 RX ORDER — TIRZEPATIDE 5 MG/.5ML
5 INJECTION, SOLUTION SUBCUTANEOUS
Refills: 0 | Status: ACTIVE | COMMUNITY

## 2024-07-03 ENCOUNTER — APPOINTMENT (OUTPATIENT)
Dept: GYNECOLOGIC ONCOLOGY | Facility: CLINIC | Age: 64
End: 2024-07-03
Payer: COMMERCIAL

## 2024-07-03 VITALS
DIASTOLIC BLOOD PRESSURE: 82 MMHG | BODY MASS INDEX: 39.15 KG/M2 | SYSTOLIC BLOOD PRESSURE: 145 MMHG | WEIGHT: 235 LBS | HEIGHT: 65 IN | HEART RATE: 63 BPM

## 2024-07-03 DIAGNOSIS — Z13.820 ENCOUNTER FOR SCREENING FOR OSTEOPOROSIS: ICD-10-CM

## 2024-07-03 DIAGNOSIS — T50.905A ABNORMAL WEIGHT LOSS: ICD-10-CM

## 2024-07-03 DIAGNOSIS — R63.4 ABNORMAL WEIGHT LOSS: ICD-10-CM

## 2024-07-03 DIAGNOSIS — N39.3 STRESS INCONTINENCE (FEMALE) (MALE): ICD-10-CM

## 2024-07-03 DIAGNOSIS — C54.1 MALIGNANT NEOPLASM OF ENDOMETRIUM: ICD-10-CM

## 2024-07-03 DIAGNOSIS — K59.09 OTHER CONSTIPATION: ICD-10-CM

## 2024-07-03 DIAGNOSIS — Z12.39 ENCOUNTER FOR OTHER SCREENING FOR MALIGNANT NEOPLASM OF BREAST: ICD-10-CM

## 2024-07-03 PROCEDURE — 99459 PELVIC EXAMINATION: CPT

## 2024-07-03 PROCEDURE — 99215 OFFICE O/P EST HI 40 MIN: CPT

## 2024-07-09 ENCOUNTER — RX RENEWAL (OUTPATIENT)
Age: 64
End: 2024-07-09

## 2024-07-10 ENCOUNTER — TRANSCRIPTION ENCOUNTER (OUTPATIENT)
Age: 64
End: 2024-07-10

## 2024-07-16 ENCOUNTER — TRANSCRIPTION ENCOUNTER (OUTPATIENT)
Age: 64
End: 2024-07-16

## 2024-07-16 LAB
ALBUMIN SERPL ELPH-MCNC: 4.4 G/DL
ALP BLD-CCNC: 112 U/L
ALT SERPL-CCNC: 20 U/L
ANION GAP SERPL CALC-SCNC: 10 MMOL/L
AST SERPL-CCNC: 21 U/L
BASOPHILS # BLD AUTO: 0.03 K/UL
BASOPHILS NFR BLD AUTO: 0.5 %
BILIRUB SERPL-MCNC: 0.5 MG/DL
BUN SERPL-MCNC: 26 MG/DL
CALCIUM SERPL-MCNC: 10.9 MG/DL
CHLORIDE SERPL-SCNC: 103 MMOL/L
CHOLEST SERPL-MCNC: 195 MG/DL
CO2 SERPL-SCNC: 26 MMOL/L
CREAT SERPL-MCNC: 0.9 MG/DL
EGFR: 71 ML/MIN/1.73M2
EOSINOPHIL # BLD AUTO: 0.32 K/UL
EOSINOPHIL NFR BLD AUTO: 5.3 %
ESTIMATED AVERAGE GLUCOSE: 103 MG/DL
GLUCOSE SERPL-MCNC: 93 MG/DL
HBA1C MFR BLD HPLC: 5.2 %
HCT VFR BLD CALC: 42.5 %
HDLC SERPL-MCNC: 61 MG/DL
HGB BLD-MCNC: 14 G/DL
IMM GRANULOCYTES NFR BLD AUTO: 0.2 %
LDLC SERPL CALC-MCNC: 123 MG/DL
LYMPHOCYTES # BLD AUTO: 1.71 K/UL
LYMPHOCYTES NFR BLD AUTO: 28.4 %
MAN DIFF?: NORMAL
MCHC RBC-ENTMCNC: 29.5 PG
MCHC RBC-ENTMCNC: 32.9 GM/DL
MCV RBC AUTO: 89.5 FL
MONOCYTES # BLD AUTO: 0.41 K/UL
MONOCYTES NFR BLD AUTO: 6.8 %
NEUTROPHILS # BLD AUTO: 3.54 K/UL
NEUTROPHILS NFR BLD AUTO: 58.8 %
NONHDLC SERPL-MCNC: 134 MG/DL
PLATELET # BLD AUTO: 257 K/UL
POTASSIUM SERPL-SCNC: 4.7 MMOL/L
PROT SERPL-MCNC: 7 G/DL
PTH RELATED PROT SERPL-MCNC: <2 PMOL/L
RBC # BLD: 4.75 M/UL
RBC # FLD: 13.6 %
SODIUM SERPL-SCNC: 139 MMOL/L
T4 FREE SERPL-MCNC: 1.1 NG/DL
TRIGL SERPL-MCNC: 62 MG/DL
TSH SERPL-ACNC: 1.22 UIU/ML
WBC # FLD AUTO: 6.02 K/UL

## 2024-08-27 ENCOUNTER — NON-APPOINTMENT (OUTPATIENT)
Age: 64
End: 2024-08-27

## 2024-08-27 ENCOUNTER — APPOINTMENT (OUTPATIENT)
Dept: INTERNAL MEDICINE | Facility: CLINIC | Age: 64
End: 2024-08-27

## 2024-08-27 VITALS
OXYGEN SATURATION: 98 % | RESPIRATION RATE: 12 BRPM | HEART RATE: 78 BPM | HEIGHT: 65 IN | TEMPERATURE: 97.8 F | DIASTOLIC BLOOD PRESSURE: 86 MMHG | BODY MASS INDEX: 37.99 KG/M2 | SYSTOLIC BLOOD PRESSURE: 122 MMHG | WEIGHT: 228 LBS

## 2024-08-27 DIAGNOSIS — Z00.00 ENCOUNTER FOR GENERAL ADULT MEDICAL EXAMINATION W/OUT ABNORMAL FINDINGS: ICD-10-CM

## 2024-08-27 PROCEDURE — 93000 ELECTROCARDIOGRAM COMPLETE: CPT

## 2024-08-27 PROCEDURE — 36415 COLL VENOUS BLD VENIPUNCTURE: CPT

## 2024-08-27 PROCEDURE — 99397 PER PM REEVAL EST PAT 65+ YR: CPT | Mod: GY

## 2024-08-27 RX ORDER — TIRZEPATIDE 10 MG/.5ML
10 INJECTION, SOLUTION SUBCUTANEOUS
Refills: 0 | Status: ACTIVE | COMMUNITY

## 2024-08-27 NOTE — HEALTH RISK ASSESSMENT
[Good] : ~his/her~  mood as  good [No falls in past year] : Patient reported no falls in the past year [Little interest or pleasure doing things] : 1) Little interest or pleasure doing things [Feeling down, depressed, or hopeless] : 2) Feeling down, depressed, or hopeless [0] : 2) Feeling down, depressed, or hopeless: Not at all (0) [PHQ-2 Negative - No further assessment needed] : PHQ-2 Negative - No further assessment needed [Patient reported mammogram was normal] : Patient reported mammogram was normal [[id number]] : 0 [Fully functional (bathing, dressing, toileting, transferring, walking, feeding)] : Fully functional (bathing, dressing, toileting, transferring, walking, feeding) [Fully functional (using the telephone, shopping, preparing meals, housekeeping, doing laundry, using] : Fully functional and needs no help or supervision to perform IADLs (using the telephone, shopping, preparing meals, housekeeping, doing laundry, using transportation, managing medications and managing finances) [MammogramDate] : 12/27/23

## 2024-08-27 NOTE — PLAN
[FreeTextEntry1] : Patient will continue to improve diet and exercise Patient will continue with weight loss Fasting blood work done in office today Recommend patient see cardiologist for cardiovascular workup Patient will make appoint with her 's cardiologist No changes on EKG

## 2024-08-27 NOTE — HEALTH RISK ASSESSMENT
[Good] : ~his/her~  mood as  good [No falls in past year] : Patient reported no falls in the past year [Little interest or pleasure doing things] : 1) Little interest or pleasure doing things [Feeling down, depressed, or hopeless] : 2) Feeling down, depressed, or hopeless [0] : 2) Feeling down, depressed, or hopeless: Not at all (0) [PHQ-2 Negative - No further assessment needed] : PHQ-2 Negative - No further assessment needed [Patient reported mammogram was normal] : Patient reported mammogram was normal [[id number]] : 0 [Fully functional (bathing, dressing, toileting, transferring, walking, feeding)] : Fully functional (bathing, dressing, toileting, transferring, walking, feeding) [Fully functional (using the telephone, shopping, preparing meals, housekeeping, doing laundry, using] : Fully functional and needs no help or supervision to perform IADLs (using the telephone, shopping, preparing meals, housekeeping, doing laundry, using transportation, managing medications and managing finances) [MammogramDate] : 12/27/23

## 2024-08-27 NOTE — HISTORY OF PRESENT ILLNESS
[de-identified] : Patient presents to the office today for annual wellness exam patient has been feeling well with no complaints at present time Patient has been doing well with diet Patient states still currently on Zepbound to help with weight loss

## 2024-08-27 NOTE — HISTORY OF PRESENT ILLNESS
[de-identified] : Patient presents to the office today for annual wellness exam patient has been feeling well with no complaints at present time Patient has been doing well with diet Patient states still currently on Zepbound to help with weight loss

## 2024-10-29 ENCOUNTER — RX RENEWAL (OUTPATIENT)
Age: 64
End: 2024-10-29

## 2025-01-30 ENCOUNTER — RX RENEWAL (OUTPATIENT)
Age: 65
End: 2025-01-30

## 2025-02-19 ENCOUNTER — NON-APPOINTMENT (OUTPATIENT)
Age: 65
End: 2025-02-19

## 2025-05-05 ENCOUNTER — APPOINTMENT (OUTPATIENT)
Dept: INTERNAL MEDICINE | Facility: CLINIC | Age: 65
End: 2025-05-05
Payer: MEDICARE

## 2025-05-05 VITALS
WEIGHT: 192 LBS | HEIGHT: 65 IN | TEMPERATURE: 97.5 F | OXYGEN SATURATION: 98 % | BODY MASS INDEX: 31.99 KG/M2 | HEART RATE: 89 BPM

## 2025-05-05 VITALS — SYSTOLIC BLOOD PRESSURE: 108 MMHG | DIASTOLIC BLOOD PRESSURE: 68 MMHG

## 2025-05-05 DIAGNOSIS — E21.0 PRIMARY HYPERPARATHYROIDISM: ICD-10-CM

## 2025-05-05 DIAGNOSIS — E78.00 PURE HYPERCHOLESTEROLEMIA, UNSPECIFIED: ICD-10-CM

## 2025-05-05 DIAGNOSIS — R42 DIZZINESS AND GIDDINESS: ICD-10-CM

## 2025-05-05 PROCEDURE — 99214 OFFICE O/P EST MOD 30 MIN: CPT

## 2025-05-05 PROCEDURE — G2211 COMPLEX E/M VISIT ADD ON: CPT

## 2025-05-05 PROCEDURE — 36415 COLL VENOUS BLD VENIPUNCTURE: CPT

## 2025-05-08 ENCOUNTER — TRANSCRIPTION ENCOUNTER (OUTPATIENT)
Age: 65
End: 2025-05-08

## 2025-05-08 ENCOUNTER — RX RENEWAL (OUTPATIENT)
Age: 65
End: 2025-05-08

## 2025-05-08 LAB
ALBUMIN SERPL ELPH-MCNC: 4.6 G/DL
ALP BLD-CCNC: 124 U/L
ALT SERPL-CCNC: 19 U/L
ANION GAP SERPL CALC-SCNC: 14 MMOL/L
AST SERPL-CCNC: 21 U/L
BASOPHILS # BLD AUTO: 0.06 K/UL
BASOPHILS NFR BLD AUTO: 1 %
BILIRUB SERPL-MCNC: 0.4 MG/DL
BUN SERPL-MCNC: 25 MG/DL
CALCIUM SERPL-MCNC: 11.4 MG/DL
CALCIUM SERPL-MCNC: 11.4 MG/DL
CHLORIDE SERPL-SCNC: 103 MMOL/L
CHOLEST SERPL-MCNC: 196 MG/DL
CO2 SERPL-SCNC: 21 MMOL/L
CREAT SERPL-MCNC: 0.81 MG/DL
EGFRCR SERPLBLD CKD-EPI 2021: 81 ML/MIN/1.73M2
EOSINOPHIL # BLD AUTO: 0.3 K/UL
EOSINOPHIL NFR BLD AUTO: 4.9 %
ESTIMATED AVERAGE GLUCOSE: 97 MG/DL
FERRITIN SERPL-MCNC: 180 NG/ML
FRUCTOSAMINE SERPL-MCNC: 252 UMOL/L
GLUCOSE SERPL-MCNC: 96 MG/DL
HBA1C MFR BLD HPLC: 5 %
HCT VFR BLD CALC: 43.7 %
HDLC SERPL-MCNC: 62 MG/DL
HGB BLD-MCNC: 14.2 G/DL
IMM GRANULOCYTES NFR BLD AUTO: 0.2 %
LDLC SERPL-MCNC: 120 MG/DL
LYMPHOCYTES # BLD AUTO: 2.11 K/UL
LYMPHOCYTES NFR BLD AUTO: 34.3 %
MAN DIFF?: NORMAL
MCHC RBC-ENTMCNC: 29.1 PG
MCHC RBC-ENTMCNC: 32.5 G/DL
MCV RBC AUTO: 89.5 FL
MONOCYTES # BLD AUTO: 0.6 K/UL
MONOCYTES NFR BLD AUTO: 9.7 %
NEUTROPHILS # BLD AUTO: 3.08 K/UL
NEUTROPHILS NFR BLD AUTO: 49.9 %
NONHDLC SERPL-MCNC: 134 MG/DL
PARATHYROID HORMONE INTACT: 72 PG/ML
PLATELET # BLD AUTO: 262 K/UL
POTASSIUM SERPL-SCNC: 4.6 MMOL/L
PROT SERPL-MCNC: 7.1 G/DL
RBC # BLD: 4.88 M/UL
RBC # FLD: 13.9 %
SODIUM SERPL-SCNC: 138 MMOL/L
T4 FREE SERPL-MCNC: 1.3 NG/DL
TRIGL SERPL-MCNC: 80 MG/DL
TSH SERPL-ACNC: 1.4 UIU/ML
VIT B12 SERPL-MCNC: 701 PG/ML
WBC # FLD AUTO: 6.16 K/UL

## 2025-06-19 ENCOUNTER — APPOINTMENT (OUTPATIENT)
Dept: INTERNAL MEDICINE | Facility: CLINIC | Age: 65
End: 2025-06-19
Payer: MEDICARE

## 2025-06-19 VITALS
WEIGHT: 188 LBS | HEIGHT: 65 IN | SYSTOLIC BLOOD PRESSURE: 114 MMHG | RESPIRATION RATE: 12 BRPM | DIASTOLIC BLOOD PRESSURE: 70 MMHG | BODY MASS INDEX: 31.32 KG/M2 | HEART RATE: 73 BPM | OXYGEN SATURATION: 98 % | TEMPERATURE: 98.1 F

## 2025-06-19 PROCEDURE — G2211 COMPLEX E/M VISIT ADD ON: CPT

## 2025-06-19 PROCEDURE — 99213 OFFICE O/P EST LOW 20 MIN: CPT

## 2025-07-16 ENCOUNTER — APPOINTMENT (OUTPATIENT)
Dept: GYNECOLOGIC ONCOLOGY | Facility: CLINIC | Age: 65
End: 2025-07-16
Payer: MEDICARE

## 2025-07-16 VITALS
HEIGHT: 65 IN | WEIGHT: 185 LBS | DIASTOLIC BLOOD PRESSURE: 88 MMHG | SYSTOLIC BLOOD PRESSURE: 144 MMHG | BODY MASS INDEX: 30.82 KG/M2 | HEART RATE: 111 BPM

## 2025-07-16 PROCEDURE — 99204 OFFICE O/P NEW MOD 45 MIN: CPT

## 2025-07-16 PROCEDURE — 99459 PELVIC EXAMINATION: CPT

## 2025-09-11 ENCOUNTER — APPOINTMENT (OUTPATIENT)
Dept: SURGERY | Facility: CLINIC | Age: 65
End: 2025-09-11
Payer: MEDICARE

## 2025-09-11 VITALS
WEIGHT: 185 LBS | BODY MASS INDEX: 30.82 KG/M2 | DIASTOLIC BLOOD PRESSURE: 78 MMHG | HEIGHT: 65 IN | HEART RATE: 110 BPM | OXYGEN SATURATION: 98 % | SYSTOLIC BLOOD PRESSURE: 118 MMHG

## 2025-09-11 DIAGNOSIS — M85.80 OTHER SPECIFIED DISORDERS OF BONE DENSITY AND STRUCTURE, UNSPECIFIED SITE: ICD-10-CM

## 2025-09-11 DIAGNOSIS — Z86.39 PERSONAL HISTORY OF OTHER ENDOCRINE, NUTRITIONAL AND METABOLIC DISEASE: ICD-10-CM

## 2025-09-11 DIAGNOSIS — E21.0 PRIMARY HYPERPARATHYROIDISM: ICD-10-CM

## 2025-09-11 DIAGNOSIS — E04.2 NONTOXIC MULTINODULAR GOITER: ICD-10-CM

## 2025-09-11 PROCEDURE — 99204 OFFICE O/P NEW MOD 45 MIN: CPT

## 2025-09-15 ENCOUNTER — NON-APPOINTMENT (OUTPATIENT)
Age: 65
End: 2025-09-15